# Patient Record
Sex: FEMALE | Race: WHITE | NOT HISPANIC OR LATINO | URBAN - METROPOLITAN AREA
[De-identification: names, ages, dates, MRNs, and addresses within clinical notes are randomized per-mention and may not be internally consistent; named-entity substitution may affect disease eponyms.]

---

## 2017-06-08 ENCOUNTER — OFFICE VISIT (OUTPATIENT)
Dept: OBGYN | Facility: CLINIC | Age: 37
End: 2017-06-08
Attending: OBSTETRICS & GYNECOLOGY
Payer: COMMERCIAL

## 2017-06-08 ENCOUNTER — OFFICE VISIT (OUTPATIENT)
Dept: SURGERY | Facility: CLINIC | Age: 37
End: 2017-06-08

## 2017-06-08 VITALS
BODY MASS INDEX: 22.5 KG/M2 | DIASTOLIC BLOOD PRESSURE: 67 MMHG | WEIGHT: 140 LBS | SYSTOLIC BLOOD PRESSURE: 118 MMHG | OXYGEN SATURATION: 100 % | TEMPERATURE: 97.5 F | HEIGHT: 66 IN | HEART RATE: 86 BPM

## 2017-06-08 VITALS
DIASTOLIC BLOOD PRESSURE: 73 MMHG | HEART RATE: 70 BPM | WEIGHT: 140 LBS | SYSTOLIC BLOOD PRESSURE: 113 MMHG | BODY MASS INDEX: 22.6 KG/M2

## 2017-06-08 DIAGNOSIS — D07.1 CARCINOMA IN SITU OF VULVA: Primary | ICD-10-CM

## 2017-06-08 DIAGNOSIS — K62.82 ANAL DYSPLASIA: Primary | ICD-10-CM

## 2017-06-08 PROCEDURE — 56820 COLPOSCOPY VULVA: CPT | Mod: ZF | Performed by: OBSTETRICS & GYNECOLOGY

## 2017-06-08 PROCEDURE — 99213 OFFICE O/P EST LOW 20 MIN: CPT | Mod: 25,ZF

## 2017-06-08 RX ORDER — IBUPROFEN 200 MG
200 TABLET ORAL DAILY PRN
COMMUNITY

## 2017-06-08 RX ORDER — TRAMADOL HYDROCHLORIDE 50 MG/1
50 TABLET ORAL DAILY PRN
COMMUNITY
Start: 2017-03-01

## 2017-06-08 ASSESSMENT — PAIN SCALES - GENERAL
PAINLEVEL: NO PAIN (0)
PAINLEVEL: NO PAIN (0)

## 2017-06-08 NOTE — MR AVS SNAPSHOT
"              After Visit Summary   6/8/2017    Ivis Clayton    MRN: [id number]           Patient Information     Date Of Birth          1980        Visit Information        Provider Department      6/8/2017 1:00 PM Shikha Cisneros APRN [facility]        Today's Diagnoses     Anal dysplasia    -  1       Follow-ups after your visit        Who to contact     Please call your clinic at [phone] to:    Ask questions about your health    Make or cancel appointments    Discuss your medicines    Learn about your test results    Speak to your doctor   If you have compliments or concerns about an experience at your clinic, or if you wish to file a complaint, please contact [facility] Patient Relations at [phone] or email us at [email or web address]         Additional Information About Your Visit        MyChart Information     [employer] gives you secure access to your electronic health record. If you see a primary care provider, you can also send messages to your care team and make appointments. If you have questions, please call your primary care clinic.  If you do not have a primary care provider, please call [phone] and they will assist you.      "[employer] is an electronic gateway that provides easy, online access to your medical records. With "[employer] you can request a clinic appointment, read your test results, renew a prescription or communicate with your care team.     To access your existing account, please contact your [facility] or call [phone] for assistance.        Care EveryWhere ID     This is your Care EveryWhere ID. This could be used by other organizations to access your [place] medical records  [id number]        Your Vitals Were     Pulse Temperature Height Pulse Oximetry BMI (Body Mass Index)       86 97.5  F (36.4  C) (Oral) 5' 6\" 100% 22.6 kg/m2        Blood " Pressure from Last 3 Encounters:   06/08/17 118/67   06/08/17 113/73   05/11/16 105/72    Weight from Last 3 Encounters:   06/08/17 140 lb   06/08/17 140 lb   05/11/16 143 lb 4.8 oz              We Performed the Following     Cytology non gyn (Anal PAP)        Primary Care Provider Office Phone # Fax #    Moraima Santiago [phone]       [facility] [address] [id number]        Thank you!     Thank you for choosing [facility] COLON AND RECTAL SURGERY  for your care. Our goal is always to provide you with excellent care. Hearing back from our patients is one way we can continue to improve our services. Please take a few minutes to complete the written survey that you may receive in the mail after your visit with us. Thank you!             Your Updated Medication List - Protect others around you: Learn how to safely use, store and throw away your medicines at www.disposemymeds.org.          This list is accurate as of: 6/8/17  1:54 PM.  Always use your most recent med list.                   Brand Name Dispense Instructions for use    ibuprofen 200 MG tablet    ADVIL/MOTRIN     Take 200 mg by mouth daily as needed       traMADol 50 MG tablet    ULTRAM     Take 50 mg by mouth daily as needed

## 2017-06-08 NOTE — PROGRESS NOTES
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 She has had fulguration of anal condyloma with Dr. Brewer in the past. She underwent high-resolution anal microscopy on 2014 which was normal and Pap smear at that time was normal. She had repeat anal Pap smear on 2015. She has no immune suppression and no other risk factors for anal cancer. She is a nonsmoker. It was decided that she should return yearly for anal cytology and we would repeat high-resolution anoscopy if this is abnormal at any time.  She denies any anorectal complaints. She remains healthy.    Assessment/Plan: Obtained anal cytology today. No internal or external warts on exam. No anorectal difficulties. I recommended a repeat anal cytology in one year. She continues to follow closely with gynecology as well.  Patient's questions were answered to her stated satisfaction and she is in agreement with this plan.    Medical history:  Past Medical History:   Diagnosis Date     Ovarian cysts      S/P hysterectomy      Uterus didelphys      MYRIAM III (vulvar intraepithelial neoplasia III)        Surgical history:  Past Surgical History:   Procedure Laterality Date     EXCISE VULVA WIDE LOCAL  2011    Procedure:EXCISE VULVA WIDE LOCAL; Surgeon requests choice anesthesia       FULGURATE CONDYLOMA RECTUM  2011    Procedure:FULGURATE CONDYLOMA RECTUM; Examination under anesthesia, anal microscopy, fulgration of anal warts, excision of left labia majora ulcer. ; Surgeon:LUIS BREWER; Location[facility]     HYSTERECTOMY TOTAL ABDOMINAL  2009    c/b immediate post-op hemorrhage, requiring laparatomy     left salpingoophorectomy       wide local excision of vulva         Problem list:  Patient Active  "Problem List    Diagnosis Date Noted     Anal warts 12/03/2012     Priority: Medium     MYRIAM (vulval intraepithelial neoplasia) I 07/24/2012     Priority: Medium       Medications:  Current Outpatient Prescriptions   Medication Sig Dispense Refill     traMADol (ULTRAM) 50 MG tablet Take 50 mg by mouth daily as needed       ibuprofen (ADVIL/MOTRIN) 200 MG tablet Take 200 mg by mouth daily as needed         Allergies:  Allergies   Allergen Reactions     Codeine Sulfate Rash       Family history:  Family History   Problem Relation Age of Onset     Gynecology Mother      endometriosis     C.A.D. Father      s/p 4 stent placement     Hypertension Father      Breast Cancer No family hx of      Cancer - colorectal No family hx of      CANCER No family hx of      no ovarian cancer       Social history:  Social History   Substance Use Topics     Smoking status: Never Smoker     Smokeless tobacco: Never Used     Alcohol use Yes      Comment: rarely     Marital status: .  Occupation: RN    Nursing Notes:   Adriana Fleming LPN  6/8/2017  1:19 PM  Signed  Chief Complaint   Patient presents with     Clinic Care Coordination - Follow-up     HRA procedure today.       Vitals:    06/08/17 1302   BP: 118/67   BP Location: Left arm   Patient Position: Chair   Cuff Size: Adult Regular   Pulse: 86   Temp: 97.5  F (36.4  C)   TempSrc: Oral   SpO2: 100%   Weight: 140 lb   Height: 5' 6\"       Body mass index is 22.6 kg/(m^2).      Dawson RAMEY LPN                         Physical Examination:  /67 (BP Location: Left arm, Patient Position: Chair, Cuff Size: Adult Regular)  Pulse 86  Temp 97.5  F (36.4  C) (Oral)  Ht 5' 6\"  Wt 140 lb  SpO2 100%  BMI 22.6 kg/m2  General: alert, oriented, in no acute distress, sitting comfortably   HEENT: mucous membranes moist   Perianal external examination:  Perianal skin: Intact with no excoriation or lichenification.  Lesions: No evidence of an external lesion, nodularity, or induration in " the perianal region.  Eversion of buttocks: There was not evidence of an anal fissure. Details: N/A.  Skin tags or external hemorrhoids: None.    Digital rectal examination: Was performed.   Sphincter tone: Good.  Palpable lesions: No.  Other: None.  Bimanual examination: was not performed.    Anoscopy: Was performed.   Hemorrhoids: Grade 1-2 internal hemorrhoids without bleeding  Lesions: No.    Total face to face time was 15 minutes, >50% counseling.    JUAN Huang  Colon and Rectal Surgery  [facility]

## 2017-06-08 NOTE — MR AVS SNAPSHOT
After Visit Summary   6/8/2017    Ivis Clayton    MRN: [id number]           Patient Information     Date Of Birth          1980        Visit Information        Provider Department      6/8/2017 9:15 AM Tami Gautam MD; [facility] RESOURCE PROCEDURE [facility]        Today's Diagnoses     History of Carcinoma in situ of vulva    -  1       Follow-ups after your visit        Follow-up notes from your care team     Return in about 1 year (around 6/8/2018) for vulvar colposcopy.      Your next 10 appointments already scheduled     Jun 08, 2017  1:00 PM CDT   (Arrive by 12:45 PM)   Return Visit with MAIKEL Dorado [facility] Colon and Rectal Surgery ([facility] [place])    [address] [place] [id number] [phone]              Who to contact     Please call your clinic at [phone] to:    Ask questions about your health    Make or cancel appointments    Discuss your medicines    Learn about your test results    Speak to your doctor   If you have compliments or concerns about an experience at your clinic, or if you wish to file a complaint, please contact [facility] Patient Relations at [phone] or email us at [email or web address]         Additional Information About Your Visit        MyChart Information     [employer] gives you secure access to your electronic health record. If you see a primary care provider, you can also send messages to your care team and make appointments. If you have questions, please call your primary care clinic.  If you do not have a primary care provider, please call [phone] and they will assist you.      [employer] is an electronic gateway that provides easy, online access to your medical records. With [employer], you can request a clinic appointment, read your test results, renew a prescription or communicate with your care team.      To access your existing account, please contact your [facility] or call [phone] for assistance.        Care EveryWhere ID     This is your Care EveryWhere ID. This could be used by other organizations to access your [place] medical records  [id number]        Your Vitals Were     Pulse Breastfeeding? BMI (Body Mass Index)             70 No 22.6 kg/m2          Blood Pressure from Last 3 Encounters:   06/08/17 113/73   05/11/16 105/72   04/07/15 113/67    Weight from Last 3 Encounters:   06/08/17 63.5 kg (140 lb)   05/11/16 65 kg (143 lb 4.8 oz)   04/07/15 63.3 kg (139 lb 9.6 oz)              We Performed the Following     Colposcopy of Vulva [[id number]]        Primary Care Provider Office Phone # Fax #    Moraima Santiago [phone]       [facility] [address] [address]        Thank you!     Thank you for choosing [facility]  for your care. Our goal is always to provide you with excellent care. Hearing back from our patients is one way we can continue to improve our services. Please take a few minutes to complete the written survey that you may receive in the mail after your visit with us. Thank you!             Your Updated Medication List - Protect others around you: Learn how to safely use, store and throw away your medicines at www.disposemymeds.org.          This list is accurate as of: 6/8/17 10:11 AM.  Always use your most recent med list.                   Brand Name Dispense Instructions for use    ibuprofen 200 MG tablet    ADVIL/MOTRIN     Take 200 mg by mouth daily as needed       traMADol 50 MG tablet    ULTRAM     Take 50 mg by mouth daily as needed

## 2017-06-08 NOTE — LETTER
2017      RE: Ivis Clayton  [address] [place] [address]       Colon and Rectal Surgery Follow-Up Clinic Note    RE: Ivis Clayton  : 1980  PORTIA: 2017    Ivis Clayton is a very pleasant 36 year old female here for follow-up of anal dysplasia and anal warts.  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 She has had fulguration of anal condyloma with Dr. Brewer in the past. She underwent high-resolution anal microscopy on 2014 which was normal and Pap smear at that time was normal. She had repeat anal Pap smear on 2015. She has no immune suppression and no other risk factors for anal cancer. She is a nonsmoker. It was decided that she should return yearly for anal cytology and we would repeat high-resolution anoscopy if this is abnormal at any time.  She denies any anorectal complaints. She remains healthy.    Assessment/Plan: Obtained anal cytology today. No internal or external warts on exam. No anorectal difficulties. I recommended a repeat anal cytology in one year. She continues to follow closely with gynecology as well.  Patient's questions were answered to her stated satisfaction and she is in agreement with this plan.    Medical history:  Past Medical History:   Diagnosis Date     Ovarian cysts      S/P hysterectomy      Uterus didelphys      MYRIAM III (vulvar intraepithelial neoplasia III)        Surgical history:  Past Surgical History:   Procedure Laterality Date     EXCISE VULVA WIDE LOCAL  2011    Procedure:EXCISE VULVA WIDE LOCAL; Surgeon requests choice anesthesia       FULGURATE CONDYLOMA RECTUM  2011    Procedure:FULGURATE CONDYLOMA RECTUM; Examination under anesthesia, anal microscopy, fulgration of anal warts, excision of left labia majora ulcer. ; Surgeon:LUIS BREWER; Location:[facility]     HYSTERECTOMY TOTAL ABDOMINAL  2009    c/b immediate post-op hemorrhage, requiring laparatomy     left salpingoophorectomy   "2003     wide local excision of vulva  2011       Problem list:  Patient Active Problem List    Diagnosis Date Noted     Anal warts 12/03/2012     Priority: Medium     MYRIAM (vulval intraepithelial neoplasia) I 07/24/2012     Priority: Medium       Medications:  Current Outpatient Prescriptions   Medication Sig Dispense Refill     traMADol (ULTRAM) 50 MG tablet Take 50 mg by mouth daily as needed       ibuprofen (ADVIL/MOTRIN) 200 MG tablet Take 200 mg by mouth daily as needed         Allergies:  Allergies   Allergen Reactions     Codeine Sulfate Rash       Family history:  Family History   Problem Relation Age of Onset     Gynecology Mother      endometriosis     C.A.D. Father      s/p 4 stent placement     Hypertension Father      Breast Cancer No family hx of      Cancer - colorectal No family hx of      CANCER No family hx of      no ovarian cancer       Social history:  Social History   Substance Use Topics     Smoking status: Never Smoker     Smokeless tobacco: Never Used     Alcohol use Yes      Comment: rarely     Marital status: .  Occupation: RN    Nursing Notes:   Adriana Fleming LPN  6/8/2017  1:19 PM  Signed  Chief Complaint   Patient presents with     Clinic Care Coordination - Follow-up     HRA procedure today.       Vitals:    06/08/17 1302   BP: 118/67   BP Location: Left arm   Patient Position: Chair   Cuff Size: Adult Regular   Pulse: 86   Temp: 97.5  F (36.4  C)   TempSrc: Oral   SpO2: 100%   Weight: 140 lb   Height: 5' 6\"       Body mass index is 22.6 kg/(m^2).      Dawson RAMEY LPN                         Physical Examination:  /67 (BP Location: Left arm, Patient Position: Chair, Cuff Size: Adult Regular)  Pulse 86  Temp 97.5  F (36.4  C) (Oral)  Ht 5' 6\"  Wt 140 lb  SpO2 100%  BMI 22.6 kg/m2  General: alert, oriented, in no acute distress, sitting comfortably   HEENT: mucous membranes moist   Perianal external examination:  Perianal skin: Intact with no excoriation or " lichenification.  Lesions: No evidence of an external lesion, nodularity, or induration in the perianal region.  Eversion of buttocks: There was not evidence of an anal fissure. Details: N/A.  Skin tags or external hemorrhoids: None.    Digital rectal examination: Was performed.   Sphincter tone: Good.  Palpable lesions: No.  Other: None.  Bimanual examination: was not performed.    Anoscopy: Was performed.   Hemorrhoids: Grade 1-2 internal hemorrhoids without bleeding  Lesions: No.    Total face to face time was 15 minutes, >50% counseling.    JUAN Huang  Colon and Rectal Surgery  [facility]      MAIKEL Huang CNP

## 2017-06-08 NOTE — NURSING NOTE
"Chief Complaint   Patient presents with     Clinic Care Coordination - Follow-up     HRA procedure today.       Vitals:    06/08/17 1302   BP: 118/67   BP Location: Left arm   Patient Position: Chair   Cuff Size: Adult Regular   Pulse: 86   Temp: 97.5  F (36.4  C)   TempSrc: Oral   SpO2: 100%   Weight: 140 lb   Height: 5' 6\"       Body mass index is 22.6 kg/(m^2).      Dawson RAMEY LPN                       "

## 2017-06-08 NOTE — PROGRESS NOTES
Colposcopy Visit/Procedure Note:    Ivis Clayton is an 36 year old, , who comes in for diagnosis of history of MYRIAM 3, follow-up biopsies have been MYRIAM 1.    Menstrual History:  No flowsheet data found.    Lab Results   Component Value Date    PAP NIL 2013    PAP NIL 2012       HPV negative 3/8/13    Dates/results of previous vulvar pathology: 16  Introitus, 7:00, biopsy:   -Benign squamous mucosa, keratinizing, with parakeratosis   -Chronic inflammation involving squamous epithelium and subepithelial   stroma, with epithelial reactive changes         Dates of previous vulvar pathology treatment: WLE 11 and early,     History   Smoking Status     Never Smoker   Smokeless Tobacco     Never Used       Allergies as of 2017 - Derrick as Reviewed 2017   Allergen Reaction Noted     Codeine sulfate Rash 2011        Colposcopy Procedure:    Consent:  Details of the colposcopic procedure were reviewed. Risks, benefits of treatment, and alternate forms of evaluation were discussed.  Patient's questions were elicited and answered.   Written consent was obtained and scanned into medical record.     Verification of Procedure  Just before the procedure begins, through verbal and active participation of team members, I verified:   Initials   Patient Name mip   Patient  mip   Procedure to be performed mip       OBJECTIVE: /73  Pulse 70  Wt 63.5 kg (140 lb)  Breastfeeding? No  BMI 22.6 kg/m2    Pelvic Exam:  EG/BUS: Normal genital architecture without lesions, erythema or abnormal secretions. Bartholin's, Urethra, Haslet's glands are normal.   Vagina: moist, pink, rugae with creamy, white and odorlesssecretions  Cervix: surgically absent  Rectum:anus normal    PROCEDURE:    Acetic acid and/or Lugol's solution applied to vulva.  Colposcopic exam of the vulva and distal vagina was conducted in the usual fashion.     Findings:  Mild acetowhite changes in a horseshoe  distribution around the introitus. There was no area of thicker acetowhite change, unable to see the previous biopsy site.      Biopsies were not obtained.    Assessment: MYRIAM 1, history of MYRIAM 3    Plan:   Continue surveillance visits annually, sooner if new or worsening symptoms    Follow-up today with Dr. Brewer for anal pap.    Verbalized understanding and agreement with plan.      Tami Gautam MD

## 2017-06-08 NOTE — LETTER
2017     RE: Ivis Clayton  [address] [place] [address]     Dear Colleague,    Thank you for referring your patient, Ivis Clayton, to the [facility] Please see a copy of my visit note below.    Colposcopy Visit/Procedure Note:    Ivis Clayton is an 36 year old, , who comes in for diagnosis of history of MYRIAM 3, follow-up biopsies have been MYRIAM 1.    Menstrual History:  No flowsheet data found.    Lab Results   Component Value Date    PAP NIL 2013    PAP NIL 2012       HPV negative 3/8/13    Dates/results of previous vulvar pathology: 16  Introitus, 7:00, biopsy:   -Benign squamous mucosa, keratinizing, with parakeratosis   -Chronic inflammation involving squamous epithelium and subepithelial   stroma, with epithelial reactive changes         Dates of previous vulvar pathology treatment: WLE 11 and early, 2011    History   Smoking Status     Never Smoker   Smokeless Tobacco     Never Used       Allergies as of 2017 - Derrick as Reviewed 2017   Allergen Reaction Noted     Codeine sulfate Rash 2011        Colposcopy Procedure:    Consent:  Details of the colposcopic procedure were reviewed. Risks, benefits of treatment, and alternate forms of evaluation were discussed.  Patient's questions were elicited and answered.   Written consent was obtained and scanned into medical record.     Verification of Procedure  Just before the procedure begins, through verbal and active participation of team members, I verified:   Initials   Patient Name mip   Patient  mip   Procedure to be performed mip       OBJECTIVE: /73  Pulse 70  Wt 63.5 kg (140 lb)  Breastfeeding? No  BMI 22.6 kg/m2    Pelvic Exam:  EG/BUS: Normal genital architecture without lesions, erythema or abnormal secretions. Bartholin's, Urethra, [place]'s glands are normal.   Vagina: moist, pink, rugae with  creamy, white and odorlesssecretions  Cervix: surgically absent  Rectum:anus normal    PROCEDURE:    Acetic acid and/or Lugol's solution applied to vulva.  Colposcopic exam of the vulva and distal vagina was conducted in the usual fashion.     Findings:  Mild acetowhite changes in a horseshoe distribution around the introitus. There was no area of thicker acetowhite change, unable to see the previous biopsy site.      Biopsies were not obtained.    Assessment: MYRIAM 1, history of MYRIAM 3    Plan:   Continue surveillance visits annually, sooner if new or worsening symptoms    Follow-up today with Dr. Brewer for anal pap.    Verbalized understanding and agreement with plan.      Tami Gautam MD

## 2017-06-09 ENCOUNTER — HEALTH MAINTENANCE LETTER (OUTPATIENT)
Age: 37
End: 2017-06-09

## 2017-06-09 LAB — COPATH REPORT: NORMAL

## 2017-12-16 ENCOUNTER — HEALTH MAINTENANCE LETTER (OUTPATIENT)
Age: 37
End: 2017-12-16

## 2018-06-23 ENCOUNTER — HEALTH MAINTENANCE LETTER (OUTPATIENT)
Age: 38
End: 2018-06-23

## 2019-02-15 ENCOUNTER — HEALTH MAINTENANCE LETTER (OUTPATIENT)
Age: 39
End: 2019-02-15

## 2019-09-28 ENCOUNTER — HEALTH MAINTENANCE LETTER (OUTPATIENT)
Age: 39
End: 2019-09-28

## 2020-12-07 NOTE — TELEPHONE ENCOUNTER
RECORDS RECEIVED FROM: Internal    DATE RECEIVED: 2/4/20 7AM   NOTES STATUS DETAILS   OFFICE NOTE from referring provider  Internal Internal recs in epic

## 2021-01-10 ENCOUNTER — HEALTH MAINTENANCE LETTER (OUTPATIENT)
Age: 41
End: 2021-01-10

## 2021-02-04 ENCOUNTER — PRE VISIT (OUTPATIENT)
Dept: SURGERY | Facility: CLINIC | Age: 41
End: 2021-02-04

## 2021-02-25 ENCOUNTER — OFFICE VISIT (OUTPATIENT)
Dept: SURGERY | Facility: CLINIC | Age: 41
End: 2021-02-25
Payer: COMMERCIAL

## 2021-02-25 ENCOUNTER — OFFICE VISIT (OUTPATIENT)
Dept: OBGYN | Facility: CLINIC | Age: 41
End: 2021-02-25
Attending: OBSTETRICS & GYNECOLOGY
Payer: COMMERCIAL

## 2021-02-25 VITALS
WEIGHT: 170 LBS | SYSTOLIC BLOOD PRESSURE: 114 MMHG | HEART RATE: 79 BPM | DIASTOLIC BLOOD PRESSURE: 76 MMHG | OXYGEN SATURATION: 96 % | TEMPERATURE: 98.4 F | BODY MASS INDEX: 27.44 KG/M2

## 2021-02-25 VITALS
BODY MASS INDEX: 27.44 KG/M2 | SYSTOLIC BLOOD PRESSURE: 138 MMHG | HEART RATE: 93 BPM | WEIGHT: 170 LBS | DIASTOLIC BLOOD PRESSURE: 82 MMHG

## 2021-02-25 DIAGNOSIS — A63.0 ANAL CONDYLOMA: ICD-10-CM

## 2021-02-25 DIAGNOSIS — N90.3 VULVAR DYSPLASIA: Primary | ICD-10-CM

## 2021-02-25 DIAGNOSIS — K62.82 ANAL DYSPLASIA: Primary | ICD-10-CM

## 2021-02-25 LAB
CLUE CELLS: NEGATIVE
TRICHOMONAS (WET PREP): NEGATIVE
YEAST (WET PREP): NEGATIVE

## 2021-02-25 PROCEDURE — 56605 BIOPSY OF VULVA/PERINEUM: CPT | Performed by: OBSTETRICS & GYNECOLOGY

## 2021-02-25 PROCEDURE — G0463 HOSPITAL OUTPT CLINIC VISIT: HCPCS | Mod: 25

## 2021-02-25 PROCEDURE — 99212 OFFICE O/P EST SF 10 MIN: CPT | Performed by: NURSE PRACTITIONER

## 2021-02-25 PROCEDURE — 87210 SMEAR WET MOUNT SALINE/INK: CPT | Performed by: OBSTETRICS & GYNECOLOGY

## 2021-02-25 PROCEDURE — 88305 TISSUE EXAM BY PATHOLOGIST: CPT | Mod: TC | Performed by: OBSTETRICS & GYNECOLOGY

## 2021-02-25 PROCEDURE — 88305 TISSUE EXAM BY PATHOLOGIST: CPT | Mod: 26 | Performed by: DERMATOLOGY

## 2021-02-25 PROCEDURE — 88112 CYTOPATH CELL ENHANCE TECH: CPT | Mod: GC | Performed by: PATHOLOGY

## 2021-02-25 PROCEDURE — 56821 COLPOSCOPY VULVA W/BIOPSY: CPT | Performed by: OBSTETRICS & GYNECOLOGY

## 2021-02-25 RX ORDER — ACETAMINOPHEN 500 MG
1000 TABLET ORAL
COMMUNITY

## 2021-02-25 RX ORDER — NALTREXONE HYDROCHLORIDE 50 MG/1
TABLET, FILM COATED ORAL
COMMUNITY
Start: 2021-02-03

## 2021-02-25 RX ORDER — ALBUTEROL SULFATE 90 UG/1
AEROSOL, METERED RESPIRATORY (INHALATION)
COMMUNITY
Start: 2020-07-15

## 2021-02-25 RX ORDER — TRAZODONE HYDROCHLORIDE 50 MG/1
TABLET, FILM COATED ORAL
COMMUNITY
Start: 2020-12-17

## 2021-02-25 RX ORDER — MOMETASONE FUROATE AND FORMOTEROL FUMARATE DIHYDRATE 200; 5 UG/1; UG/1
AEROSOL RESPIRATORY (INHALATION)
COMMUNITY
Start: 2020-08-17

## 2021-02-25 RX ORDER — METHOCARBAMOL 750 MG/1
TABLET, FILM COATED ORAL
COMMUNITY
Start: 2021-02-15

## 2021-02-25 RX ORDER — ALPRAZOLAM 0.25 MG
TABLET ORAL
COMMUNITY
Start: 2021-02-04

## 2021-02-25 RX ORDER — GABAPENTIN 300 MG/1
CAPSULE ORAL
COMMUNITY
Start: 2021-02-22

## 2021-02-25 ASSESSMENT — PAIN SCALES - GENERAL
PAINLEVEL: NO PAIN (0)
PAINLEVEL: SEVERE PAIN (7)
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
## 2021-02-25 NOTE — NURSING NOTE
Chief Complaint   Patient presents with     New Patient     New consult, anal pap       Vitals:    02/25/21 0708   BP: 114/76   BP Location: Left arm   Patient Position: Sitting   Cuff Size: Adult Regular   Pulse: 79   Temp: 98.4  F (36.9  C)   TempSrc: Oral   SpO2: 96%   Weight: 170 lb       Body mass index is 27.44 kg/m .       Keisha Renteria, [facility]
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
## 2021-02-26 LAB — COPATH REPORT: NORMAL

## 2021-03-01 LAB — COPATH REPORT: NORMAL

## 2021-03-04 ENCOUNTER — TELEPHONE (OUTPATIENT)
Dept: OBGYN | Facility: CLINIC | Age: 41
End: 2021-03-04

## 2021-03-04 NOTE — TELEPHONE ENCOUNTER
I called Ivis to review biopsy results.  No worrisome findings, only mild inflammatory changes, similar to her last biopsy.  She had no questions.    Tami Gautam MD

## 2021-10-23 ENCOUNTER — HEALTH MAINTENANCE LETTER (OUTPATIENT)
Age: 41
End: 2021-10-23

## 2022-02-12 ENCOUNTER — HEALTH MAINTENANCE LETTER (OUTPATIENT)
Age: 42
End: 2022-02-12

## 2022-10-09 ENCOUNTER — HEALTH MAINTENANCE LETTER (OUTPATIENT)
Age: 42
End: 2022-10-09

## 2023-02-18 ENCOUNTER — HEALTH MAINTENANCE LETTER (OUTPATIENT)
Age: 43
End: 2023-02-18

## 2023-12-14 ENCOUNTER — OFFICE VISIT (OUTPATIENT)
Dept: OBGYN | Facility: CLINIC | Age: 43
End: 2023-12-14
Attending: OBSTETRICS & GYNECOLOGY
Payer: COMMERCIAL

## 2023-12-14 VITALS
HEART RATE: 93 BPM | BODY MASS INDEX: 22 KG/M2 | HEIGHT: 66 IN | SYSTOLIC BLOOD PRESSURE: 123 MMHG | WEIGHT: 136.9 LBS | DIASTOLIC BLOOD PRESSURE: 83 MMHG

## 2023-12-14 DIAGNOSIS — N90.89 VULVAR IRRITATION: Primary | ICD-10-CM

## 2023-12-14 DIAGNOSIS — Z87.412 HISTORY OF DYSPLASIA OF VULVA: ICD-10-CM

## 2023-12-14 PROCEDURE — 99213 OFFICE O/P EST LOW 20 MIN: CPT | Performed by: OBSTETRICS & GYNECOLOGY

## 2023-12-14 NOTE — PROGRESS NOTES
"SUBJECTIVE: Ivis Clayton is a 42 yo female with a primary medical history of Roby Danlos and MYRIAM III s/p wide local excision in 2011 who presents to clinic with concerns for vulvar skin color change, pruritus, and thinning. She reports that approximately one month ago she found a pus filled sac after shaving that her  popped.  Since then she has been concerned that she may be having a recurrence of MYRIAM.    Past Medical History:   Diagnosis Date    Ovarian cysts     S/P hysterectomy     Uterus didelphys     MYRIAM III (vulvar intraepithelial neoplasia III)      Past Surgical History:   Procedure Laterality Date    EXCISE VULVA WIDE LOCAL  06/09/2011    Procedure:EXCISE VULVA WIDE LOCAL; Surgeon requests choice anesthesia      FULGURATE CONDYLOMA RECTUM  12/02/2011    Procedure:FULGURATE CONDYLOMA RECTUM; Examination under anesthesia, anal microscopy, fulgration of anal warts, excision of left labia majora ulcer. ; Surgeon:LUIS ERICKSON; Location:[facility]    HYSTERECTOMY TOTAL ABDOMINAL  09/01/2009    c/b immediate post-op hemorrhage, requiring laparatomy    LAPAROSCOPY DIAGNOSTIC (GYN)  02/07/2023    Lysis of adhesions, paratubal cyst removal, no evidence of endometriosis    left salpingoophorectomy  01/01/2003    wide local excision of vulva  01/01/2011         OBJECTIVE: /83   Pulse 93   Ht 1.676 m (5' 6\")   Wt 62.1 kg (136 lb 14.4 oz)   BMI 22.10 kg/m     on exam the vulva has normal skin tone, hair distribution, anatomy, and is clear of lesions.  No inguinal lymphadenopathy.    ASSESSMENT: Ivis Clayton is a 42 yo female with a history of MYRIAM III normal exam today.    PLAN:   #MYRIAM III  -Yearly follow-up and exam if no changes  - Discussed vulvar hygiene, use of topical estrogen as she enters menopause, but without evidence of atrophy today do not recommend starting.    #AIN I  - Last visit in 2021, anal pap was unsatisfactory, advise return visit (previously had recommended 1 year " follow-up).    Mendez Duarte  Medical Student- 3    I, Tami Gautam, was present with the medical student who participated in the service and in the documentation of the note. I have verified the history and personally performed the physical exam and medical decision making. I agree with the assessment and plan of care as documented in the note.     Tami Gautam MD

## 2023-12-14 NOTE — LETTER
"12/14/2023       RE: Ivis Clayton  [address] [place] [address]     Dear Colleague,    Thank you for referring your patient, Ivis Clayton, to the [facility] WOMEN[facility]S CLINIC [place] at . Please see a copy of my visit note below.    SUBJECTIVE: Ivis Clayton is a 44 yo female with a primary medical history of Roby Danlos and MYRIAM III s/p wide local excision in 2011 who presents to clinic with concerns for vulvar skin color change, pruritus, and thinning. She reports that approximately one month ago she found a pus filled sac after shaving that her  popped.  Since then she has been concerned that she may be having a recurrence of MYRIAM.    Past Medical History:   Diagnosis Date    Ovarian cysts     S/P hysterectomy     Uterus didelphys     MYRIAM III (vulvar intraepithelial neoplasia III)      Past Surgical History:   Procedure Laterality Date    EXCISE VULVA WIDE LOCAL  06/09/2011    Procedure:EXCISE VULVA WIDE LOCAL; Surgeon requests choice anesthesia      FULGURATE CONDYLOMA RECTUM  12/02/2011    Procedure:FULGURATE CONDYLOMA RECTUM; Examination under anesthesia, anal microscopy, fulgration of anal warts, excision of left labia majora ulcer. ; Surgeon:LUIS ERICKSON; Location:[facility] OR    HYSTERECTOMY TOTAL ABDOMINAL  09/01/2009    c/b immediate post-op hemorrhage, requiring laparatomy    LAPAROSCOPY DIAGNOSTIC (GYN)  02/07/2023    Lysis of adhesions, paratubal cyst removal, no evidence of endometriosis    left salpingoophorectomy  01/01/2003    wide local excision of vulva  01/01/2011         OBJECTIVE: /83   Pulse 93   Ht 1.676 m (5' 6\")   Wt 62.1 kg (136 lb 14.4 oz)   BMI 22.10 kg/m     on exam the vulva has normal skin tone, hair distribution, anatomy, and is clear of lesions.  No inguinal lymphadenopathy.    ASSESSMENT: Ivis Clayton is a 44 yo female with a history of MYRIAM III normal exam today.    PLAN: "   #MYRIAM III  -Yearly follow-up and exam if no changes  - Discussed vulvar hygiene, use of topical estrogen as she enters menopause, but without evidence of atrophy today do not recommend starting.    #AIN I  - Last visit in 2021, anal pap was unsatisfactory, advise return visit (previously had recommended 1 year follow-up).    Mendez Duarte  Medical Student- 3    I, Tami Gautam, was present with the medical student who participated in the service and in the documentation of the note. I have verified the history and personally performed the physical exam and medical decision making. I agree with the assessment and plan of care as documented in the note.     Tami Gautam MD

## 2024-03-16 ENCOUNTER — HEALTH MAINTENANCE LETTER (OUTPATIENT)
Age: 44
End: 2024-03-16

## 2025-03-22 ENCOUNTER — HEALTH MAINTENANCE LETTER (OUTPATIENT)
Age: 45
End: 2025-03-22

## 2025-05-03 ENCOUNTER — HEALTH MAINTENANCE LETTER (OUTPATIENT)
Age: 45
End: 2025-05-03